# Patient Record
Sex: FEMALE | Race: WHITE | NOT HISPANIC OR LATINO | Employment: FULL TIME | URBAN - METROPOLITAN AREA
[De-identification: names, ages, dates, MRNs, and addresses within clinical notes are randomized per-mention and may not be internally consistent; named-entity substitution may affect disease eponyms.]

---

## 2017-01-11 DIAGNOSIS — Z01.89 ROUTINE LAB DRAW: Primary | ICD-10-CM

## 2017-01-11 LAB
ALBUMIN SERPL-MCNC: 4.8 G/DL (ref 3.5–5.2)
ALBUMIN/GLOB SERPL: 1.9 G/DL
ALP SERPL-CCNC: 37 U/L (ref 39–117)
ALT SERPL-CCNC: 11 U/L (ref 1–33)
AST SERPL-CCNC: 15 U/L (ref 1–32)
BASOPHILS # BLD AUTO: 0.02 10*3/MM3 (ref 0–0.2)
BASOPHILS NFR BLD AUTO: 0.3 % (ref 0–1.5)
BILIRUB SERPL-MCNC: 0.3 MG/DL (ref 0.1–1.2)
BUN SERPL-MCNC: 9 MG/DL (ref 6–20)
BUN/CREAT SERPL: 13 (ref 7–25)
CALCIUM SERPL-MCNC: 9.8 MG/DL (ref 8.6–10.5)
CHLORIDE SERPL-SCNC: 103 MMOL/L (ref 98–107)
CHOLEST SERPL-MCNC: 180 MG/DL (ref 0–200)
CHOLEST/HDLC SERPL: 2.47 {RATIO}
CO2 SERPL-SCNC: 24.7 MMOL/L (ref 22–29)
CREAT SERPL-MCNC: 0.69 MG/DL (ref 0.57–1)
EOSINOPHIL # BLD AUTO: 0.12 10*3/MM3 (ref 0–0.7)
EOSINOPHIL NFR BLD AUTO: 2.1 % (ref 0.3–6.2)
ERYTHROCYTE [DISTWIDTH] IN BLOOD BY AUTOMATED COUNT: 12.5 % (ref 11.7–13)
GLOBULIN SER CALC-MCNC: 2.5 GM/DL
GLUCOSE SERPL-MCNC: 89 MG/DL (ref 65–99)
HCT VFR BLD AUTO: 39.5 % (ref 35.6–45.5)
HDLC SERPL-MCNC: 73 MG/DL (ref 40–60)
HGB BLD-MCNC: 12.9 G/DL (ref 11.9–15.5)
IMM GRANULOCYTES # BLD: 0 10*3/MM3 (ref 0–0.03)
IMM GRANULOCYTES NFR BLD: 0 % (ref 0–0.5)
LDLC SERPL CALC-MCNC: 99 MG/DL (ref 0–100)
LYMPHOCYTES # BLD AUTO: 1.7 10*3/MM3 (ref 0.9–4.8)
LYMPHOCYTES NFR BLD AUTO: 29.6 % (ref 19.6–45.3)
MCH RBC QN AUTO: 32.7 PG (ref 26.9–32)
MCHC RBC AUTO-ENTMCNC: 32.7 G/DL (ref 32.4–36.3)
MCV RBC AUTO: 100 FL (ref 80.5–98.2)
MONOCYTES # BLD AUTO: 0.54 10*3/MM3 (ref 0.2–1.2)
MONOCYTES NFR BLD AUTO: 9.4 % (ref 5–12)
NEUTROPHILS # BLD AUTO: 3.36 10*3/MM3 (ref 1.9–8.1)
NEUTROPHILS NFR BLD AUTO: 58.6 % (ref 42.7–76)
PLATELET # BLD AUTO: 385 10*3/MM3 (ref 140–500)
POTASSIUM SERPL-SCNC: 4.6 MMOL/L (ref 3.5–5.2)
PROT SERPL-MCNC: 7.3 G/DL (ref 6–8.5)
RBC # BLD AUTO: 3.95 10*6/MM3 (ref 3.9–5.2)
SODIUM SERPL-SCNC: 140 MMOL/L (ref 136–145)
TRIGL SERPL-MCNC: 41 MG/DL (ref 0–150)
VLDLC SERPL CALC-MCNC: 8.2 MG/DL (ref 5–40)
WBC # BLD AUTO: 5.74 10*3/MM3 (ref 4.5–10.7)

## 2017-01-12 ENCOUNTER — TELEPHONE (OUTPATIENT)
Dept: FAMILY MEDICINE CLINIC | Facility: CLINIC | Age: 32
End: 2017-01-12

## 2017-01-12 NOTE — TELEPHONE ENCOUNTER
----- Message from Lou Condon sent at 1/12/2017 12:24 PM EST -----  Patient returned your call [phone]

## 2017-01-18 ENCOUNTER — OFFICE VISIT (OUTPATIENT)
Dept: FAMILY MEDICINE CLINIC | Facility: CLINIC | Age: 32
End: 2017-01-18

## 2017-01-18 VITALS
WEIGHT: 128 LBS | RESPIRATION RATE: 16 BRPM | HEART RATE: 70 BPM | DIASTOLIC BLOOD PRESSURE: 60 MMHG | BODY MASS INDEX: 20.09 KG/M2 | HEIGHT: 67 IN | OXYGEN SATURATION: 99 % | SYSTOLIC BLOOD PRESSURE: 94 MMHG

## 2017-01-18 DIAGNOSIS — Z00.00 PHYSICAL EXAM: Primary | ICD-10-CM

## 2017-01-18 PROCEDURE — 99395 PREV VISIT EST AGE 18-39: CPT | Performed by: NURSE PRACTITIONER

## 2017-01-18 NOTE — MR AVS SNAPSHOT
AMARIS Parsonsmons   1/18/2017 8:00 AM   Office Visit    Provider:  SARAH Lozano   Department:  [facility] PRIMARY CARE   Dept Phone:  [phone]                Your Full Care Plan              Today's Medication Changes          These changes are accurate as of: 1/18/17  8:38 AM.  If you have any questions, ask your nurse or doctor.               Stop taking medication(s)listed here:     azithromycin 250 MG tablet   Commonly known as:  ZITHROMAX Z-KAN   Stopped by:  SARAH Lozano                      Your Updated Medication List      Notice  As of 1/18/2017  8:38 AM    You have not been prescribed any medications.            You Were Diagnosed With        Codes Comments    Physical exam    -  Primary ICD-10-CM: Z00.00  ICD-9-CM: V70.9       Instructions     None    Patient Instructions History      ICS [employer] Signup     Our records indicate that you have an active [employer] account.    You can view your After Visit Summary by going to [employer] and logging in with your [employer] username and password.  If you don't have a [employer] username and password but a parent or guardian has access to your record, the parent or guardian should login with their own [employer] username and password and access your record to view the After Visit Summary.    If you have questions, you can email [email or web address] or call [phone] to talk to our [employer] staff.  Remember, [employer] is NOT to be used for urgent needs.  For medical emergencies, dial 911.               Other Info from Your Visit           Your Appointments     Jan 18, 2017  1:45 PM EST   New Patient with Mary Ann Crain MD   [facility] GASTROENTEROLOGY (--)    [address]  [place] [address] [phone]           Bring all previous medical records and films, along with current medications and insurance information.              Allergies     "Penicillins  Hives    Sulfacetamide        Reason for Visit     Annual Exam           Vital Signs     Blood Pressure Pulse Respirations Height Weight Last Menstrual Period    94/60 70 16 67\" (170.2 cm) 128 lb (58.1 kg) 12/24/2016 (Approximate)    Oxygen Saturation Body Mass Index Smoking Status             99% 20.05 kg/m2 Passive Smoke Exposure - Never Smoker         Problems and Diagnoses Noted     Physical exam    -  Primary      "

## 2017-01-18 NOTE — PROGRESS NOTES
"Preventive Exam    History of Present Illness: AMARIS is here for check up and review of routine health maintenance. Still with abdominal pain and is seeing GI today.    REVIEW OF SYSTEMS  Constitutional: Negative.    HENT: Negative.    Eyes: Negative.    Respiratory: Negative.    Cardiovascular: Negative.    Gastrointestinal: Negative.    Endocrine: Negative.    Genitourinary: Negative.    Musculoskeletal: Negative for neck stiffness.   Skin: Negative.    Allergic/Immunologic: Negative.    Neurological: Negative.    Hematological: Negative.    Psychiatric/Behavioral: Negative.    All other systems reviewed and are negative.    PFHX: Mother and Father pgm, mgm  Cholesterol Mom and Dad  Diabetes None  Cad: PGM  PSHX: wisdom teeth  Employed courses for education, [email or web address] [facility].  Exercises and has decent diet, going through divorce.      PHYSICAL EXAM    Vitals:    01/18/17 0810   BP: 94/60   Pulse: 70   Resp: 16   SpO2: 99%   Weight: 128 lb (58.1 kg)   Height: 67\" (170.2 cm)     GENERAL: alert and oriented, afebrile and vital signs stable  HEENT: oral mucosa moist, PEERLA, EOM, conjunctiva normal  No cervical adenopathy  LUNGS: clear to ascultation bilaterally, no rales, ronchi or wheezing  HEART: RRR S1 S2 without murmers, thrills, rubs or gallops  CHEST WALL: within normal limits, no tenderness  BREAST EXAM: No masses, skin changes, tenderness or discharge noted  ABDOMEN: WNL. Normal BS.  EXTREMITIES: No clubbing, cyanosis or edema noted. Normal Pulses.  SKIN: warm, dry, no rashes noted  NEURO: CN II- XII grossly intact    ASSESSMENT AND PLAN  Problem List Items Addressed This Visit     None      Visit Diagnoses     Physical exam    -  Primary        Routine health maintenance reviewed and discussed with AMARIS.    .No orders of the defined types were placed in this encounter.    No Follow-up on file.  "

## 2017-03-29 ENCOUNTER — TELEPHONE (OUTPATIENT)
Dept: FAMILY MEDICINE CLINIC | Facility: CLINIC | Age: 32
End: 2017-03-29

## 2017-03-30 NOTE — TELEPHONE ENCOUNTER
I don't see anything in her chart. She will need to be seen by Ivon to discuss this. Please ask her to make an appt.

## 2017-05-16 ENCOUNTER — OFFICE VISIT (OUTPATIENT)
Dept: FAMILY MEDICINE CLINIC | Facility: CLINIC | Age: 32
End: 2017-05-16

## 2017-05-16 VITALS
SYSTOLIC BLOOD PRESSURE: 98 MMHG | BODY MASS INDEX: 19.98 KG/M2 | HEIGHT: 67 IN | DIASTOLIC BLOOD PRESSURE: 76 MMHG | OXYGEN SATURATION: 98 % | WEIGHT: 127.3 LBS | HEART RATE: 59 BPM

## 2017-05-16 DIAGNOSIS — L65.9 HAIR LOSS: ICD-10-CM

## 2017-05-16 DIAGNOSIS — F41.9 ANXIETY: ICD-10-CM

## 2017-05-16 DIAGNOSIS — R53.83 FATIGUE, UNSPECIFIED TYPE: Primary | ICD-10-CM

## 2017-05-16 PROCEDURE — 99213 OFFICE O/P EST LOW 20 MIN: CPT | Performed by: NURSE PRACTITIONER

## 2017-05-16 RX ORDER — CLONAZEPAM 0.5 MG/1
0.5 TABLET ORAL DAILY
Qty: 15 TABLET | Refills: 0 | Status: SHIPPED | OUTPATIENT
Start: 2017-05-16 | End: 2018-11-14

## 2017-05-16 RX ORDER — CHLORAL HYDRATE 500 MG
CAPSULE ORAL
COMMUNITY
End: 2018-11-14

## 2017-05-16 RX ORDER — UBIDECARENONE 75 MG
50 CAPSULE ORAL DAILY
COMMUNITY
End: 2018-11-14

## 2017-05-17 LAB — TSH SERPL DL<=0.005 MIU/L-ACNC: 2.1 MIU/ML (ref 0.27–4.2)

## 2017-05-30 ENCOUNTER — TELEPHONE (OUTPATIENT)
Dept: FAMILY MEDICINE CLINIC | Facility: CLINIC | Age: 32
End: 2017-05-30

## 2018-02-13 ENCOUNTER — TELEPHONE (OUTPATIENT)
Dept: FAMILY MEDICINE CLINIC | Facility: CLINIC | Age: 33
End: 2018-02-13

## 2018-02-13 NOTE — TELEPHONE ENCOUNTER
Patient left a VM requesting a refill of Klonopin. She will need an appointment, please call to schedule.

## 2018-11-14 ENCOUNTER — OFFICE VISIT (OUTPATIENT)
Dept: FAMILY MEDICINE CLINIC | Facility: CLINIC | Age: 33
End: 2018-11-14

## 2018-11-14 VITALS
SYSTOLIC BLOOD PRESSURE: 112 MMHG | HEIGHT: 68 IN | BODY MASS INDEX: 19.25 KG/M2 | DIASTOLIC BLOOD PRESSURE: 60 MMHG | HEART RATE: 100 BPM | WEIGHT: 127 LBS | OXYGEN SATURATION: 98 %

## 2018-11-14 DIAGNOSIS — R10.9 FLANK PAIN: ICD-10-CM

## 2018-11-14 DIAGNOSIS — N30.00 ACUTE CYSTITIS WITHOUT HEMATURIA: Primary | ICD-10-CM

## 2018-11-14 LAB
BILIRUB BLD-MCNC: NEGATIVE MG/DL
CLARITY, POC: CLEAR
COLOR UR: YELLOW
GLUCOSE UR STRIP-MCNC: NEGATIVE MG/DL
KETONES UR QL: NEGATIVE
LEUKOCYTE EST, POC: ABNORMAL
NITRITE UR-MCNC: NEGATIVE MG/ML
PH UR: 5 [PH] (ref 5–8)
PROT UR STRIP-MCNC: NEGATIVE MG/DL
RBC # UR STRIP: NEGATIVE /UL
SP GR UR: 1 (ref 1–1.03)
UROBILINOGEN UR QL: NORMAL

## 2018-11-14 PROCEDURE — 81002 URINALYSIS NONAUTO W/O SCOPE: CPT | Performed by: NURSE PRACTITIONER

## 2018-11-14 PROCEDURE — 99213 OFFICE O/P EST LOW 20 MIN: CPT | Performed by: NURSE PRACTITIONER

## 2018-11-14 RX ORDER — NITROFURANTOIN 25; 75 MG/1; MG/1
100 CAPSULE ORAL EVERY 12 HOURS SCHEDULED
Qty: 14 CAPSULE | Refills: 0 | Status: SHIPPED | OUTPATIENT
Start: 2018-11-14 | End: 2019-05-06

## 2018-11-14 NOTE — PROGRESS NOTES
"Elizabeth Wilks is a 32 y.o. female.Elizabeth has had lower abdominal pain and right sided flank pain for the last few days.       Assessment/Plan   Problem List Items Addressed This Visit     None      Visit Diagnoses     Acute cystitis without hematuria    -  Primary    Relevant Medications    nitrofurantoin, macrocrystal-monohydrate, (MACROBID) 100 MG capsule    Other Relevant Orders    Urine Culture - Urine, Urine, Clean Catch    Flank pain        Relevant Orders    POCT urinalysis dipstick, manual (Completed)    Urine Culture - Urine, Urine, Clean Catch             No Follow-up on file.  There are no Patient Instructions on file for this visit.    Chief Complaint   Patient presents with   • Urinary Tract Infection     Social History     Tobacco Use   • Smoking status: Passive Smoke Exposure - Never Smoker   • Smokeless tobacco: Never Used   Substance Use Topics   • Alcohol use: Yes     Comment: 2/week   • Drug use: No       History of Present Illness     The following portions of the patient's history were reviewed and updated as appropriate:PMHroutine: Social history , Allergies, Current Medications, Active Problem List and Health Maintenance    Review of Systems   Constitutional: Negative for activity change.   Gastrointestinal: Positive for abdominal pain.   Genitourinary: Positive for flank pain.       Objective   Vitals:    11/14/18 1315   BP: 112/60   Pulse: 100   SpO2: 98%   Weight: 57.6 kg (127 lb)   Height: 172.7 cm (68\")     Body mass index is 19.31 kg/m².  Physical Exam   Constitutional: She appears well-developed and well-nourished. No distress.   HENT:   Head: Normocephalic and atraumatic.   Pulmonary/Chest: Effort normal.   Musculoskeletal: Normal range of motion.   Skin: Skin is warm.   Nursing note and vitals reviewed.    Reviewed Data:  Office Visit on 11/14/2018   Component Date Value Ref Range Status   • Color 11/14/2018 Yellow  Yellow, Straw, Dark Yellow, Zoey Final   • Clarity, UA 11/14/2018 Clear "  Clear Final   • Glucose, UA 11/14/2018 Negative  Negative, 1000 mg/dL (3+) mg/dL Final   • Bilirubin 11/14/2018 Negative  Negative Final   • Ketones, UA 11/14/2018 Negative  Negative Final   • Specific Gravity  11/14/2018 1.005  1.005 - 1.030 Final   • Blood, UA 11/14/2018 Negative  Negative Final   • pH, Urine 11/14/2018 5.0  5.0 - 8.0 Final   • Protein, POC 11/14/2018 Negative  Negative mg/dL Final   • Urobilinogen, UA 11/14/2018 Normal  Normal Final   • Leukocytes 11/14/2018 Trace* Negative Final   • Nitrite, UA 11/14/2018 Negative  Negative Final

## 2018-11-16 LAB
BACTERIA UR CULT: NO GROWTH
BACTERIA UR CULT: NORMAL

## 2018-11-19 ENCOUNTER — TELEPHONE (OUTPATIENT)
Dept: FAMILY MEDICINE CLINIC | Facility: CLINIC | Age: 33
End: 2018-11-19

## 2019-05-06 ENCOUNTER — OFFICE VISIT (OUTPATIENT)
Dept: FAMILY MEDICINE CLINIC | Facility: CLINIC | Age: 34
End: 2019-05-06

## 2019-05-06 VITALS
BODY MASS INDEX: 19.85 KG/M2 | HEART RATE: 67 BPM | HEIGHT: 68 IN | RESPIRATION RATE: 16 BRPM | DIASTOLIC BLOOD PRESSURE: 78 MMHG | OXYGEN SATURATION: 98 % | WEIGHT: 131 LBS | SYSTOLIC BLOOD PRESSURE: 124 MMHG

## 2019-05-06 DIAGNOSIS — Z09 FOLLOW-UP EXAMINATION: Primary | ICD-10-CM

## 2019-05-06 DIAGNOSIS — R31.9 URINARY TRACT INFECTION WITH HEMATURIA, SITE UNSPECIFIED: ICD-10-CM

## 2019-05-06 DIAGNOSIS — N39.0 URINARY TRACT INFECTION WITH HEMATURIA, SITE UNSPECIFIED: ICD-10-CM

## 2019-05-06 PROCEDURE — 81002 URINALYSIS NONAUTO W/O SCOPE: CPT | Performed by: NURSE PRACTITIONER

## 2019-05-06 PROCEDURE — 99213 OFFICE O/P EST LOW 20 MIN: CPT | Performed by: NURSE PRACTITIONER

## 2019-05-06 NOTE — PATIENT INSTRUCTIONS
Urinary Tract Infection, Adult  A urinary tract infection (UTI) is an infection of any part of the urinary tract. The urinary tract includes the:  · Kidneys.  · Ureters.  · Bladder.  · Urethra.    These organs make, store, and get rid of pee (urine) in the body.  Follow these instructions at home:  · Take over-the-counter and prescription medicines only as told by your doctor.  · If you were prescribed an antibiotic medicine, take it as told by your doctor. Do not stop taking the antibiotic even if you start to feel better.  · Avoid the following drinks:  ? Alcohol.  ? Caffeine.  ? Tea.  ? Carbonated drinks.  · Drink enough fluid to keep your pee clear or pale yellow.  · Keep all follow-up visits as told by your doctor. This is important.  · Make sure to:  ? Empty your bladder often and completely. Do not to hold pee for long periods of time.  ? Empty your bladder before and after sex.  ? Wipe from front to back after a bowel movement if you are female. Use each tissue one time when you wipe.  Contact a doctor if:  · You have back pain.  · You have a fever.  · You feel sick to your stomach (nauseous).  · You throw up (vomit).  · Your symptoms do not get better after 3 days.  · Your symptoms go away and then come back.  Get help right away if:  · You have very bad back pain.  · You have very bad lower belly (abdominal) pain.  · You are throwing up and cannot keep down any medicines or water.  This information is not intended to replace advice given to you by your health care provider. Make sure you discuss any questions you have with your health care provider.  Document Released: 06/05/2009 Document Revised: 06/12/2018 Document Reviewed: 11/07/2016  [employer] Patient Education © 2019 [employer]

## 2019-05-06 NOTE — PROGRESS NOTES
Elizabeth Wilks is a 33 y.o. female. Pt is here for ER follow up. Pt had a episode for about 5  Minutes where she could not see clearly things, bilaterally.  Went to [facility] and they didn't really do any tests, she chalks it up to traveling and doing a lot of flying  She uses contacts and is getting her eyes looked at next week      Assessment/Plan   Problem List Items Addressed This Visit     None      Visit Diagnoses     Follow-up examination    -  Primary    Relevant Orders    POC Urinalysis Dipstick    Urinary tract infection with hematuria, site unspecified        Relevant Orders    POC Urinalysis Dipstick    Urine Culture - Urine, Urine, Clean Catch             Return if symptoms worsen or fail to improve.  Patient Instructions   Urinary Tract Infection, Adult  A urinary tract infection (UTI) is an infection of any part of the urinary tract. The urinary tract includes the:  · Kidneys.  · Ureters.  · Bladder.  · Urethra.    These organs make, store, and get rid of pee (urine) in the body.  Follow these instructions at home:  · Take over-the-counter and prescription medicines only as told by your doctor.  · If you were prescribed an antibiotic medicine, take it as told by your doctor. Do not stop taking the antibiotic even if you start to feel better.  · Avoid the following drinks:  ? Alcohol.  ? Caffeine.  ? Tea.  ? Carbonated drinks.  · Drink enough fluid to keep your pee clear or pale yellow.  · Keep all follow-up visits as told by your doctor. This is important.  · Make sure to:  ? Empty your bladder often and completely. Do not to hold pee for long periods of time.  ? Empty your bladder before and after sex.  ? Wipe from front to back after a bowel movement if you are female. Use each tissue one time when you wipe.  Contact a doctor if:  · You have back pain.  · You have a fever.  · You feel sick to your stomach (nauseous).  · You throw up (vomit).  · Your symptoms do not get better after 3 days.  · Your  "symptoms go away and then come back.  Get help right away if:  · You have very bad back pain.  · You have very bad lower belly (abdominal) pain.  · You are throwing up and cannot keep down any medicines or water.  This information is not intended to replace advice given to you by your health care provider. Make sure you discuss any questions you have with your health care provider.  Document Released: 06/05/2009 Document Revised: 06/12/2018 Document Reviewed: 11/07/2016  [employer] Interactive Patient Education © 2019 [employer]        Chief Complaint   Patient presents with   • Vision Disturbance     Social History     Tobacco Use   • Smoking status: Passive Smoke Exposure - Never Smoker   • Smokeless tobacco: Never Used   Substance Use Topics   • Alcohol use: Yes     Comment: 2/week   • Drug use: No       History of Present Illness     The following portions of the patient's history were reviewed and updated as appropriate:PMHroutine: Social history , Past Medical History, Allergies, Current Medications, Active Problem List and Health Maintenance    Review of Systems   Constitutional: Negative for activity change and appetite change.   HENT: Negative for ear pain.    Eyes: Positive for visual disturbance. Negative for photophobia.   Respiratory: Negative for cough.    Neurological: Negative for dizziness.       Objective   Vitals:    05/06/19 1058   BP: 124/78   Pulse: 67   Resp: 16   SpO2: 98%   Weight: 59.4 kg (131 lb)   Height: 172.7 cm (68\")     Body mass index is 19.92 kg/m².  Physical Exam   Constitutional: She appears well-developed and well-nourished. No distress.   HENT:   Head: Normocephalic and atraumatic.   Eyes: EOM are normal. Pupils are equal, round, and reactive to light. Right eye exhibits normal extraocular motion and no nystagmus. Left eye exhibits normal extraocular motion.   Fundoscopic exam:       The right eye shows no papilledema.        The left eye shows no papilledema.   Nursing note " and vitals reviewed.    Reviewed Data:  No visits with results within 1 Month(s) from this visit.   Latest known visit with results is:   Office Visit on 11/14/2018   Component Date Value Ref Range Status   • Color 11/14/2018 Yellow  Yellow, Straw, Dark Yellow, Zoey Final   • Clarity, UA 11/14/2018 Clear  Clear Final   • Glucose, UA 11/14/2018 Negative  Negative, 1000 mg/dL (3+) mg/dL Final   • Bilirubin 11/14/2018 Negative  Negative Final   • Ketones, UA 11/14/2018 Negative  Negative Final   • Specific Gravity  11/14/2018 1.005  1.005 - 1.030 Final   • Blood, UA 11/14/2018 Negative  Negative Final   • pH, Urine 11/14/2018 5.0  5.0 - 8.0 Final   • Protein, POC 11/14/2018 Negative  Negative mg/dL Final   • Urobilinogen, UA 11/14/2018 Normal  Normal Final   • Leukocytes 11/14/2018 Trace* Negative Final   • Nitrite, UA 11/14/2018 Negative  Negative Final   • Urine Culture 11/14/2018 Final report   Final   • Result 1 11/14/2018 No growth   Final

## 2019-05-08 LAB
BACTERIA UR CULT: NO GROWTH
BACTERIA UR CULT: NORMAL

## 2019-05-09 LAB
BILIRUB BLD-MCNC: NEGATIVE MG/DL
CLARITY, POC: CLEAR
COLOR UR: ABNORMAL
GLUCOSE UR STRIP-MCNC: NEGATIVE MG/DL
KETONES UR QL: NEGATIVE
LEUKOCYTE EST, POC: NEGATIVE
NITRITE UR-MCNC: NEGATIVE MG/ML
PH UR: 6.5 [PH] (ref 5–8)
PROT UR STRIP-MCNC: NEGATIVE MG/DL
RBC # UR STRIP: NEGATIVE /UL
SP GR UR: 1020 (ref 1–1.03)
UROBILINOGEN UR QL: NORMAL

## 2019-05-14 ENCOUNTER — PATIENT MESSAGE (OUTPATIENT)
Dept: FAMILY MEDICINE CLINIC | Facility: CLINIC | Age: 34
End: 2019-05-14

## 2019-11-18 ENCOUNTER — OFFICE VISIT (OUTPATIENT)
Dept: FAMILY MEDICINE CLINIC | Facility: CLINIC | Age: 34
End: 2019-11-18

## 2019-11-18 VITALS
HEART RATE: 66 BPM | WEIGHT: 133 LBS | SYSTOLIC BLOOD PRESSURE: 120 MMHG | BODY MASS INDEX: 20.16 KG/M2 | HEIGHT: 68 IN | DIASTOLIC BLOOD PRESSURE: 80 MMHG | OXYGEN SATURATION: 98 %

## 2019-11-18 DIAGNOSIS — R51.9 PRESSURE IN HEAD: Primary | ICD-10-CM

## 2019-11-18 DIAGNOSIS — R53.83 FATIGUE, UNSPECIFIED TYPE: ICD-10-CM

## 2019-11-18 LAB
BASOPHILS # BLD AUTO: 0.04 10*3/MM3 (ref 0–0.2)
BASOPHILS NFR BLD AUTO: 0.7 % (ref 0–1.5)
EOSINOPHIL # BLD AUTO: 0.2 10*3/MM3 (ref 0–0.4)
EOSINOPHIL NFR BLD AUTO: 3.3 % (ref 0.3–6.2)
ERYTHROCYTE [DISTWIDTH] IN BLOOD BY AUTOMATED COUNT: 12 % (ref 12.3–15.4)
HCT VFR BLD AUTO: 37.2 % (ref 34–46.6)
HGB BLD-MCNC: 12.6 G/DL (ref 12–15.9)
IMM GRANULOCYTES # BLD AUTO: 0.01 10*3/MM3 (ref 0–0.05)
IMM GRANULOCYTES NFR BLD AUTO: 0.2 % (ref 0–0.5)
LYMPHOCYTES # BLD AUTO: 1.42 10*3/MM3 (ref 0.7–3.1)
LYMPHOCYTES NFR BLD AUTO: 23.2 % (ref 19.6–45.3)
MCH RBC QN AUTO: 33.1 PG (ref 26.6–33)
MCHC RBC AUTO-ENTMCNC: 33.9 G/DL (ref 31.5–35.7)
MCV RBC AUTO: 97.6 FL (ref 79–97)
MONOCYTES # BLD AUTO: 0.64 10*3/MM3 (ref 0.1–0.9)
MONOCYTES NFR BLD AUTO: 10.4 % (ref 5–12)
NEUTROPHILS # BLD AUTO: 3.82 10*3/MM3 (ref 1.7–7)
NEUTROPHILS NFR BLD AUTO: 62.2 % (ref 42.7–76)
NRBC BLD AUTO-RTO: 0 /100 WBC (ref 0–0.2)
PLATELET # BLD AUTO: 334 10*3/MM3 (ref 140–450)
RBC # BLD AUTO: 3.81 10*6/MM3 (ref 3.77–5.28)
RETICS/RBC NFR AUTO: 1.77 % (ref 0.7–1.9)
WBC # BLD AUTO: 6.13 10*3/MM3 (ref 3.4–10.8)

## 2019-11-18 PROCEDURE — 99213 OFFICE O/P EST LOW 20 MIN: CPT | Performed by: NURSE PRACTITIONER

## 2019-11-18 NOTE — PROGRESS NOTES
Subjective fatigue and head pressure  Elizabeth Wilks is a 33 y.o. female.     History of Present Illness   Has been sick for the last 3 weeks and went to an urgent care and was given steroids and antibiotics that didn't help.  She reports that she has pressure in her head She does not have any pain it is just pressure.    The following portions of the patient's history were reviewed and updated as appropriate: allergies, current medications, past family history, past medical history, past social history, past surgical history and problem list.    Review of Systems   Constitutional: Positive for fatigue. Negative for fever.   HENT: Positive for sinus pressure. Negative for sore throat.    Respiratory: Positive for chest tightness and shortness of breath. Negative for cough.    Gastrointestinal: Positive for abdominal pain.   Neurological: Negative for dizziness, facial asymmetry, headache and memory problem.       Objective   Physical Exam   Constitutional: She is oriented to person, place, and time. She appears well-developed and well-nourished.   HENT:   Head: Normocephalic and atraumatic.   Right Ear: External ear normal.   Left Ear: External ear normal.   Mouth/Throat: Oropharynx is clear and moist.   Eyes: Conjunctivae and EOM are normal. Pupils are equal, round, and reactive to light.   Neck: Neck supple.   Cardiovascular: Normal rate and regular rhythm.   Pulmonary/Chest: Effort normal and breath sounds normal. No respiratory distress.   Abdominal: Bowel sounds are normal.   Musculoskeletal: Normal range of motion.   Lymphadenopathy:     She has no cervical adenopathy.   Neurological: She is alert and oriented to person, place, and time.   Skin: Skin is warm and dry.   Psychiatric: She has a normal mood and affect.   Nursing note and vitals reviewed.        Assessment/Plan   Pressure in head  Fatigue, unspecified type  Iron panel labs drawn  We will call with lab results

## 2019-11-19 LAB
FERRITIN SERPL-MCNC: 40.2 NG/ML (ref 13–150)
FOLATE SERPL-MCNC: 11.5 NG/ML (ref 4.78–24.2)
IRON SERPL-MCNC: 80 MCG/DL (ref 37–145)
TSH SERPL DL<=0.005 MIU/L-ACNC: 1.44 UIU/ML (ref 0.27–4.2)
VIT B12 SERPL-MCNC: 624 PG/ML (ref 211–946)

## 2019-11-26 NOTE — PATIENT INSTRUCTIONS
Anemia    Anemia is a condition in which you do not have enough red blood cells or hemoglobin. Hemoglobin is a substance in red blood cells that carries oxygen. When you do not have enough red blood cells or hemoglobin (are anemic), your body cannot get enough oxygen and your organs may not work properly. As a result, you may feel very tired or have other problems.  What are the causes?  Common causes of anemia include:  · Excessive bleeding. Anemia can be caused by excessive bleeding inside or outside the body, including bleeding from the intestine or from periods in women.  · Poor nutrition.  · Long-lasting (chronic) kidney, thyroid, and liver disease.  · Bone marrow disorders.  · Cancer and treatments for cancer.  · HIV (human immunodeficiency virus) and AIDS (acquired immunodeficiency syndrome).  · Treatments for HIV and AIDS.  · Spleen problems.  · Blood disorders.  · Infections, medicines, and autoimmune disorders that destroy red blood cells.  What are the signs or symptoms?  Symptoms of this condition include:  · Minor weakness.  · Dizziness.  · Headache.  · Feeling heartbeats that are irregular or faster than normal (palpitations).  · Shortness of breath, especially with exercise.  · Paleness.  · Cold sensitivity.  · Indigestion.  · Nausea.  · Difficulty sleeping.  · Difficulty concentrating.  Symptoms may occur suddenly or develop slowly. If your anemia is mild, you may not have symptoms.  How is this diagnosed?  This condition is diagnosed based on:  · Blood tests.  · Your medical history.  · A physical exam.  · Bone marrow biopsy.  Your health care provider may also check your stool (feces) for blood and may do additional testing to look for the cause of your bleeding.  You may also have other tests, including:  · Imaging tests, such as a CT scan or MRI.  · Endoscopy.  · Colonoscopy.  How is this treated?  Treatment for this condition depends on the cause. If you continue to lose a lot of blood, you may  need to be treated at a hospital. Treatment may include:  · Taking supplements of iron, vitamin B12, or folic acid.  · Taking a hormone medicine (erythropoietin) that can help to stimulate red blood cell growth.  · Having a blood transfusion. This may be needed if you lose a lot of blood.  · Making changes to your diet.  · Having surgery to remove your spleen.  Follow these instructions at home:  · Take over-the-counter and prescription medicines only as told by your health care provider.  · Take supplements only as told by your health care provider.  · Follow any diet instructions that you were given.  · Keep all follow-up visits as told by your health care provider. This is important.  Contact a health care provider if:  · You develop new bleeding anywhere in the body.  Get help right away if:  · You are very weak.  · You are short of breath.  · You have pain in your abdomen or chest.  · You are dizzy or feel faint.  · You have trouble concentrating.  · You have bloody or black, tarry stools.  · You vomit repeatedly or you vomit up blood.  Summary  · Anemia is a condition in which you do not have enough red blood cells or enough of a substance in your red blood cells that carries oxygen (hemoglobin).  · Symptoms may occur suddenly or develop slowly.  · If your anemia is mild, you may not have symptoms.  · This condition is diagnosed with blood tests as well as a medical history and physical exam. Other tests may be needed.  · Treatment for this condition depends on the cause of the anemia.  This information is not intended to replace advice given to you by your health care provider. Make sure you discuss any questions you have with your health care provider.  Document Released: 01/25/2006 Document Revised: 01/19/2018 Document Reviewed: 01/19/2018  [employer] Patient Education © 2019 [employer]

## 2019-12-12 ENCOUNTER — OFFICE VISIT (OUTPATIENT)
Dept: FAMILY MEDICINE CLINIC | Facility: CLINIC | Age: 34
End: 2019-12-12

## 2019-12-12 VITALS
WEIGHT: 130 LBS | HEART RATE: 77 BPM | DIASTOLIC BLOOD PRESSURE: 80 MMHG | BODY MASS INDEX: 19.7 KG/M2 | OXYGEN SATURATION: 98 % | SYSTOLIC BLOOD PRESSURE: 120 MMHG | HEIGHT: 68 IN

## 2019-12-12 DIAGNOSIS — Z00.00 PHYSICAL EXAM: Primary | ICD-10-CM

## 2019-12-12 PROCEDURE — 99395 PREV VISIT EST AGE 18-39: CPT | Performed by: NURSE PRACTITIONER

## 2019-12-12 NOTE — PROGRESS NOTES
Subjective physical exam    Elizabeth Wilks is a 33 y.o. female.     History of Present Illness   PMHX,PSHX and PFHX reviewed in chart and with pt.  Well balanced diet  Good sleep hygiene  Regular exercise    The following portions of the patient's history were reviewed and updated as appropriate: allergies, current medications, past family history, past medical history, past social history, past surgical history and problem list.    Review of Systems   Constitutional: Negative for activity change, appetite change, chills and fever.   HENT: Negative for congestion.    Eyes: Negative for pain.   Respiratory: Negative for cough and shortness of breath.    Cardiovascular: Negative for chest pain and leg swelling.   Gastrointestinal: Negative for nausea and vomiting.   Genitourinary: Negative for difficulty urinating.   Skin: Negative for rash.   Neurological: Negative for dizziness, facial asymmetry and headache.       Objective   Physical Exam   Constitutional: She is oriented to person, place, and time. She appears well-developed and well-nourished.   HENT:   Head: Normocephalic and atraumatic.   Right Ear: External ear normal.   Left Ear: External ear normal.   Mouth/Throat: Oropharynx is clear and moist.   Eyes: Pupils are equal, round, and reactive to light. Conjunctivae and EOM are normal.   Neck: Neck supple.   Cardiovascular: Normal rate and regular rhythm.   Pulmonary/Chest: Effort normal and breath sounds normal. No respiratory distress.   Abdominal: Bowel sounds are normal.   Musculoskeletal: Normal range of motion.   Lymphadenopathy:     She has no cervical adenopathy.   Neurological: She is alert and oriented to person, place, and time.   Skin: Skin is warm and dry.   Psychiatric: She has a normal mood and affect.   Nursing note and vitals reviewed.        Assessment/Plan   Elizabeth was seen today for annual exam.    Diagnoses and all orders for this visit:    Physical exam  -     Lipid Panel With LDL / HDL  Ratio        Preventative counseling for diet and exercise with patient at visit.  Pt reports that they wear their seatbelt regularly.

## 2019-12-12 NOTE — PATIENT INSTRUCTIONS
Preventive Care 18-39 Years, Female  Preventive care refers to lifestyle choices and visits with your health care provider that can promote health and wellness.  What does preventive care include?    · A yearly physical exam. This is also called an annual well check.  · Dental exams once or twice a year.  · Routine eye exams. Ask your health care provider how often you should have your eyes checked.  · Personal lifestyle choices, including:  ? Daily care of your teeth and gums.  ? Regular physical activity.  ? Eating a healthy diet.  ? Avoiding tobacco and drug use.  ? Limiting alcohol use.  ? Practicing safe sex.  ? Taking vitamin and mineral supplements as recommended by your health care provider.  What happens during an annual well check?  The services and screenings done by your health care provider during your annual well check will depend on your age, overall health, lifestyle risk factors, and family history of disease.  Counseling  Your health care provider may ask you questions about your:  · Alcohol use.  · Tobacco use.  · Drug use.  · Emotional well-being.  · Home and relationship well-being.  · Sexual activity.  · Eating habits.  · Work and work environment.  · Method of birth control.  · Menstrual cycle.  · Pregnancy history.  Screening  You may have the following tests or measurements:  · Height, weight, and BMI.  · Diabetes screening. This is done by checking your blood sugar (glucose) after you have not eaten for a while (fasting).  · Blood pressure.  · Lipid and cholesterol levels. These may be checked every 5 years starting at age 20.  · Skin check.  · Hepatitis C blood test.  · Hepatitis B blood test.  · Sexually transmitted disease (STD) testing.  · BRCA-related cancer screening. This may be done if you have a family history of breast, ovarian, tubal, or peritoneal cancers.  · Pelvic exam and Pap test. This may be done every 3 years starting at age 21. Starting at age 30, this may be done every 5  years if you have a Pap test in combination with an HPV test.  Discuss your test results, treatment options, and if necessary, the need for more tests with your health care provider.  Vaccines  Your health care provider may recommend certain vaccines, such as:  · Influenza vaccine. This is recommended every year.  · Tetanus, diphtheria, and acellular pertussis (Tdap, Td) vaccine. You may need a Td booster every 10 years.  · Varicella vaccine. You may need this if you have not been vaccinated.  · HPV vaccine. If you are 26 or younger, you may need three doses over 6 months.  · Measles, mumps, and rubella (MMR) vaccine. You may need at least one dose of MMR. You may also need a second dose.  · Pneumococcal 13-valent conjugate (PCV13) vaccine. You may need this if you have certain conditions and were not previously vaccinated.  · Pneumococcal polysaccharide (PPSV23) vaccine. You may need one or two doses if you smoke cigarettes or if you have certain conditions.  · Meningococcal vaccine. One dose is recommended if you are age 19-21 years and a first-year college student living in a residence yun, or if you have one of several medical conditions. You may also need additional booster doses.  · Hepatitis A vaccine. You may need this if you have certain conditions or if you travel or work in places where you may be exposed to hepatitis A.  · Hepatitis B vaccine. You may need this if you have certain conditions or if you travel or work in places where you may be exposed to hepatitis B.  · Haemophilus influenzae type b (Hib) vaccine. You may need this if you have certain risk factors.  Talk to your health care provider about which screenings and vaccines you need and how often you need them.  This information is not intended to replace advice given to you by your health care provider. Make sure you discuss any questions you have with your health care provider.  Document Released: 02/13/2003 Document Revised: 07/31/2018  Document Reviewed: 10/18/2016  [employer] Interactive Patient Education © 2019 [employer]

## 2019-12-13 LAB
CHOLEST SERPL-MCNC: 186 MG/DL (ref 0–200)
HDLC SERPL-MCNC: 64 MG/DL (ref 40–60)
LDLC SERPL CALC-MCNC: 114 MG/DL (ref 0–100)
LDLC/HDLC SERPL: 1.79 {RATIO}
TRIGL SERPL-MCNC: 38 MG/DL (ref 0–150)
VLDLC SERPL CALC-MCNC: 7.6 MG/DL

## 2020-02-10 ENCOUNTER — OFFICE VISIT (OUTPATIENT)
Dept: FAMILY MEDICINE CLINIC | Facility: CLINIC | Age: 35
End: 2020-02-10

## 2020-02-10 VITALS
BODY MASS INDEX: 19.7 KG/M2 | HEART RATE: 87 BPM | DIASTOLIC BLOOD PRESSURE: 70 MMHG | WEIGHT: 130 LBS | RESPIRATION RATE: 12 BRPM | OXYGEN SATURATION: 98 % | HEIGHT: 68 IN | SYSTOLIC BLOOD PRESSURE: 128 MMHG

## 2020-02-10 DIAGNOSIS — N30.01 ACUTE CYSTITIS WITH HEMATURIA: Primary | ICD-10-CM

## 2020-02-10 LAB
BILIRUB BLD-MCNC: NEGATIVE MG/DL
CLARITY, POC: CLEAR
COLOR UR: YELLOW
GLUCOSE UR STRIP-MCNC: NEGATIVE MG/DL
KETONES UR QL: NEGATIVE
LEUKOCYTE EST, POC: NEGATIVE
NITRITE UR-MCNC: NEGATIVE MG/ML
PH UR: 5 [PH] (ref 5–8)
PROT UR STRIP-MCNC: NEGATIVE MG/DL
RBC # UR STRIP: ABNORMAL /UL
SP GR UR: 1 (ref 1–1.03)
UROBILINOGEN UR QL: NORMAL

## 2020-02-10 PROCEDURE — 81002 URINALYSIS NONAUTO W/O SCOPE: CPT | Performed by: NURSE PRACTITIONER

## 2020-02-10 PROCEDURE — 99213 OFFICE O/P EST LOW 20 MIN: CPT | Performed by: NURSE PRACTITIONER

## 2020-02-10 RX ORDER — NITROFURANTOIN 25; 75 MG/1; MG/1
100 CAPSULE ORAL 2 TIMES DAILY
Qty: 20 CAPSULE | Refills: 0 | Status: SHIPPED | OUTPATIENT
Start: 2020-02-10 | End: 2021-08-17

## 2020-02-10 NOTE — PROGRESS NOTES
Subjective dysuria  Elizabeth Wilks is a 34 y.o. female.   Patient here for UTI  History of Present Illness     The following portions of the patient's history were reviewed and updated as appropriate: allergies, current medications, past family history, past medical history, past social history, past surgical history and problem list.    Review of Systems   Genitourinary: Positive for dysuria, frequency, pelvic pressure and urgency.       Objective   Physical Exam   Constitutional: She is oriented to person, place, and time. She appears well-developed and well-nourished.   HENT:   Head: Normocephalic and atraumatic.   Mouth/Throat: Oropharynx is clear and moist.   Eyes: Pupils are equal, round, and reactive to light. Conjunctivae and EOM are normal.   Pulmonary/Chest: Effort normal. No respiratory distress.   Abdominal: Bowel sounds are normal.   Musculoskeletal: Normal range of motion.   Lymphadenopathy:     She has no cervical adenopathy.   Neurological: She is alert and oriented to person, place, and time.   Skin: Skin is warm and dry.   Psychiatric: She has a normal mood and affect.   Nursing note and vitals reviewed.        Assessment/Plan   Problem List Items Addressed This Visit     None      Visit Diagnoses     Acute cystitis with hematuria    -  Primary    Relevant Medications    nitrofurantoin, macrocrystal-monohydrate, (MACROBID) 100 MG capsule    Other Relevant Orders    POC Urinalysis Dipstick    Urine Culture - Urine, Urine, Clean Catch               Return if symptoms worsen or fail to improve.

## 2020-02-13 LAB
BACTERIA UR CULT: ABNORMAL
BACTERIA UR CULT: ABNORMAL
OTHER ANTIBIOTIC SUSC ISLT: ABNORMAL

## 2021-04-16 ENCOUNTER — BULK ORDERING (OUTPATIENT)
Dept: CASE MANAGEMENT | Facility: OTHER | Age: 36
End: 2021-04-16

## 2021-04-16 DIAGNOSIS — Z23 IMMUNIZATION DUE: ICD-10-CM

## 2021-08-17 ENCOUNTER — OFFICE VISIT (OUTPATIENT)
Dept: FAMILY MEDICINE CLINIC | Facility: CLINIC | Age: 36
End: 2021-08-17

## 2021-08-17 VITALS
HEIGHT: 68 IN | BODY MASS INDEX: 18.94 KG/M2 | OXYGEN SATURATION: 100 % | SYSTOLIC BLOOD PRESSURE: 108 MMHG | HEART RATE: 78 BPM | DIASTOLIC BLOOD PRESSURE: 62 MMHG | RESPIRATION RATE: 12 BRPM | WEIGHT: 125 LBS

## 2021-08-17 DIAGNOSIS — F41.9 ANXIETY: Primary | ICD-10-CM

## 2021-08-17 PROCEDURE — 99213 OFFICE O/P EST LOW 20 MIN: CPT | Performed by: NURSE PRACTITIONER

## 2021-08-17 RX ORDER — ACETAMINOPHEN AND CODEINE PHOSPHATE 120; 12 MG/5ML; MG/5ML
SOLUTION ORAL
COMMUNITY
Start: 2021-06-08

## 2021-08-17 RX ORDER — FLUOXETINE HYDROCHLORIDE 20 MG/1
20 CAPSULE ORAL DAILY
Qty: 30 CAPSULE | Refills: 1 | Status: SHIPPED | OUTPATIENT
Start: 2021-08-17 | End: 2021-08-20 | Stop reason: SDUPTHER

## 2021-08-17 NOTE — PROGRESS NOTES
Subjective   Elizabeth Wilks is a 35 y.o. female.   Anxiety    History of Present Illness   Anxiety and is having trouble with some panic attack that she had a couple of weeks.Her dog had some fleas and they were bitten by.She was upset because she thought they had bed bugs.She feels like her anxiety is not a daily problem.No SI or HI.    The following portions of the patient's history were reviewed and updated as appropriate: allergies, current medications, past family history, past medical history, past social history, past surgical history and problem list.    Review of Systems   Constitutional: Negative for activity change and appetite change.   Respiratory: Positive for chest tightness. Negative for cough.    Psychiatric/Behavioral: The patient is nervous/anxious.        Objective   Physical Exam  Vitals and nursing note reviewed.   Constitutional:       Appearance: Normal appearance.   HENT:      Head: Normocephalic and atraumatic.      Right Ear: Ear canal normal.      Left Ear: Ear canal normal.   Cardiovascular:      Rate and Rhythm: Normal rate and regular rhythm.      Pulses: Normal pulses.      Heart sounds: Normal heart sounds.   Pulmonary:      Effort: Pulmonary effort is normal.      Breath sounds: Normal breath sounds.   Musculoskeletal:         General: Normal range of motion.   Neurological:      Mental Status: She is alert.   Psychiatric:         Mood and Affect: Mood is anxious.         Thought Content: Thought content does not include homicidal or suicidal ideation.           Assessment/Plan   Problem List Items Addressed This Visit     None      Visit Diagnoses     Anxiety    -  Primary        Placed her on a trial of Prozac 20 mg daily.  We will follow up in 4 to 5 weeks for reevaluation.       Return in about 5 weeks (around 9/21/2021).

## 2021-08-20 RX ORDER — FLUOXETINE HYDROCHLORIDE 20 MG/1
20 CAPSULE ORAL DAILY
Qty: 90 CAPSULE | Refills: 1 | Status: SHIPPED | OUTPATIENT
Start: 2021-08-20

## 2021-12-30 ENCOUNTER — TELEPHONE (OUTPATIENT)
Dept: FAMILY MEDICINE CLINIC | Facility: CLINIC | Age: 36
End: 2021-12-30

## 2021-12-30 NOTE — TELEPHONE ENCOUNTER
Caller: Elizabeth Wilks    Relationship: Self    Best call back number: [phone]    What is the best time to reach you: ANYTIME    Who are you requesting to speak with (clinical staff, provider,  specific staff member): CLINICAL STAFF    What was the call regarding: PATIENT CALLED IN TODAY STATING THAT SHE WAS EXPOSED TO BOTH COVID AND THE FLU. THERE ARE NO APPOINTMENTS TO GO ANYWHERE TO GET TESTED. PATIENT IS EXPERIENCING SYMPTOMS THAT MOSTLY RESEMBLE THE FLU.      PATIENT IS WONDERING WHAT SHE SHOULD DO NEXT. IF SHE NEEDS AN APPOINTMENT OR IF SHE SHOULD GO TO URGENT CARE PLEASE CALL TO LET HER KNOW.

## 2021-12-30 NOTE — TELEPHONE ENCOUNTER
Patient was advised to go to urgent care due to no openings on providers schedule  patient stated understanding

## 2022-07-21 ENCOUNTER — TELEPHONE (OUTPATIENT)
Dept: FAMILY MEDICINE CLINIC | Facility: CLINIC | Age: 37
End: 2022-07-21

## 2022-07-21 NOTE — TELEPHONE ENCOUNTER
Caller:     Best call back number:   Elizabeth Wilks (Self) [phone] (H)         What was the call regarding:  PATIENT IS WANTING TO KNOW IF DR HARDING WILL TAKE HER AS A NEW PATIENT    OFFBOARDING SARAH MATUTE      Do you require a callback: YES PLEASE        Hemodialysis Consent-Inpatient

## 2023-06-18 ENCOUNTER — HOSPITAL ENCOUNTER (EMERGENCY)
Facility: HOSPITAL | Age: 38
Discharge: HOME | End: 2023-06-18
Attending: EMERGENCY MEDICINE | Admitting: EMERGENCY MEDICINE
Payer: COMMERCIAL

## 2023-06-18 VITALS
BODY MASS INDEX: 20.46 KG/M2 | RESPIRATION RATE: 16 BRPM | HEART RATE: 85 BPM | DIASTOLIC BLOOD PRESSURE: 73 MMHG | HEIGHT: 68 IN | TEMPERATURE: 97.8 F | OXYGEN SATURATION: 100 % | SYSTOLIC BLOOD PRESSURE: 120 MMHG | WEIGHT: 135 LBS

## 2023-06-18 DIAGNOSIS — F41.0 PANIC ATTACK: ICD-10-CM

## 2023-06-18 DIAGNOSIS — R20.2 PARESTHESIA: Primary | ICD-10-CM

## 2023-06-18 PROCEDURE — 99283 EMERGENCY DEPT VISIT LOW MDM: CPT

## 2023-06-18 NOTE — ED PROVIDER NOTES
"Subjective     History provided by:  Patient and significant other  History of Present Illness    Chief complaint: Felt numb all over.  Panicky    Location: Generalized    Quality/Severity: Patient states she felt numb all over.  She states then she felt like her leg went and work and she was not seen with a song on the radio.  But then she was able to get out of the car and run around to the other side so that her significant other could drive.  Her boyfriend states that she was panicky after drinking water and acting like she was breathing rapidly.  The patient states \"my breath could not catch up with me\".    Timing/Onset: Started about 90 minutes ago and lasted for a few seconds.    Modifying Factors: None known.    Associated symptoms: Patient feels fine now.    Narrative: The patient is a 37-year-old white female who was driving a car when she suddenly felt numb all over.  She states that it felt like my leg would not push down on the gas pedal.  She states she also felt like she could not sing with a song on the radio.  Her boyfriend noticed that she felt panicky.  He noticed her breathing quickly.  The patient states she remembers that \"my breathing could not catch up with me\".  She pulled over to the side of the road and was able to get out of the car and run around to the passenger side without difficulty.  She states afterwards she was panicky.  She does have a history of panic attacks.  Her last menstrual period is now.  She has an IUD for birth control.  She does not smoke or use illicit drugs.  She works from home for [employer].  Past Medical History:   Diagnosis Date    Anxiety     Miscarriage      /73   Pulse 85   Temp 97.8 °F (36.6 °C) (Oral)   Resp 16   Ht 172.7 cm (68\")   Wt 61.2 kg (135 lb)   SpO2 100%   BMI 20.53 kg/m²   Labs Reviewed - No data to display    Review of Systems    Past Medical History:   Diagnosis Date    Anxiety     Miscarriage        Allergies   Allergen Reactions    " Penicillins Hives    Sulfa Antibiotics Hives    Sulfacetamide Itching       Past Surgical History:   Procedure Laterality Date    NASAL SEPTUM SURGERY      WISDOM TOOTH EXTRACTION      WISDOM TOOTH EXTRACTION         Family History   Problem Relation Age of Onset    Endometriosis Mother     No Known Problems Father     Breast cancer Maternal Grandmother     Pulmonary fibrosis Maternal Grandfather     Heart attack Paternal Grandmother     Throat cancer Paternal Grandfather     Autism Brother        Social History     Socioeconomic History    Marital status: Significant Other    Number of children: 0   Tobacco Use    Smoking status: Never     Passive exposure: Yes    Smokeless tobacco: Never   Substance and Sexual Activity    Alcohol use: Yes     Comment: 2/week    Drug use: No    Sexual activity: Defer           Objective   Physical Exam  Vitals and nursing note reviewed.   Constitutional:       General: She is not in acute distress.     Appearance: Normal appearance. She is well-developed and normal weight. She is not ill-appearing, toxic-appearing or diaphoretic.      Comments: The patient appears healthy in no acute distress.  Review of her vital signs: She is afebrile and all her vital signs are within normal limits.   HENT:      Head: Normocephalic and atraumatic.      Nose: Nose normal.      Mouth/Throat:      Mouth: Mucous membranes are moist.      Pharynx: Oropharynx is clear.   Eyes:      General: No scleral icterus.        Right eye: No discharge.         Left eye: No discharge.      Conjunctiva/sclera: Conjunctivae normal.      Pupils: Pupils are equal, round, and reactive to light.   Neck:      Thyroid: No thyromegaly.      Vascular: No carotid bruit or JVD.   Cardiovascular:      Rate and Rhythm: Normal rate and regular rhythm.      Heart sounds: Normal heart sounds. No murmur heard.  Pulmonary:      Effort: Pulmonary effort is normal.      Breath sounds: Normal breath sounds. No wheezing, rhonchi or  rales.   Chest:      Chest wall: No tenderness.   Abdominal:      General: Bowel sounds are normal. There is no distension.      Palpations: Abdomen is soft.      Tenderness: There is no abdominal tenderness.   Musculoskeletal:         General: No tenderness or deformity. Normal range of motion.      Cervical back: Normal range of motion and neck supple. No tenderness.   Lymphadenopathy:      Cervical: No cervical adenopathy.   Skin:     General: Skin is warm and dry.      Capillary Refill: Capillary refill takes less than 2 seconds.      Coloration: Skin is not pale.      Findings: No erythema or rash.   Neurological:      General: No focal deficit present.      Mental Status: She is alert and oriented to person, place, and time.      Cranial Nerves: No cranial nerve deficit.      Coordination: Coordination normal.      Comments: No focal motor sensory deficit   Psychiatric:         Mood and Affect: Mood normal.         Behavior: Behavior normal.         Thought Content: Thought content normal.         Judgment: Judgment normal.       Procedures           ED Course  ED Course as of 06/18/23 2310   Sun Jun 18, 2023 2309 The patient has a normal exam and is currently completely asymptomatic.  Is my impression that she had paresthesia and a panic attack that caused her symptoms. [TP]      ED Course User Index  [TP] Eladio Sharpe MD                                           Medical Decision Making  Problems Addressed:  Panic attack: acute illness or injury  Paresthesia: acute illness or injury    Labs Reviewed - No data to display  No orders to display       [email or web address]    Final diagnoses:   Paresthesia   Panic attack       ED Disposition  ED Disposition       ED Disposition   Discharge    Condition   Stable    Comment   --               [facility]  [facility] [address] [phone]  Call   To schedule an appointment with a new primary care provider.          Medication List        Stop      FLUoxetine 20 MG capsule  Commonly known as: PROzac     norethindrone 0.35 MG tablet  Commonly known as: Eladio Culp MD  06/18/23 1543